# Patient Record
Sex: FEMALE | Race: OTHER | NOT HISPANIC OR LATINO
[De-identification: names, ages, dates, MRNs, and addresses within clinical notes are randomized per-mention and may not be internally consistent; named-entity substitution may affect disease eponyms.]

---

## 2017-07-11 ENCOUNTER — TRANSCRIPTION ENCOUNTER (OUTPATIENT)
Age: 42
End: 2017-07-11

## 2017-07-11 PROBLEM — Z00.00 ENCOUNTER FOR PREVENTIVE HEALTH EXAMINATION: Status: ACTIVE | Noted: 2017-07-11

## 2017-07-14 ENCOUNTER — APPOINTMENT (OUTPATIENT)
Dept: PULMONOLOGY | Facility: CLINIC | Age: 42
End: 2017-07-14

## 2017-07-26 VITALS
WEIGHT: 154.98 LBS | TEMPERATURE: 98 F | HEART RATE: 75 BPM | SYSTOLIC BLOOD PRESSURE: 135 MMHG | HEIGHT: 65 IN | DIASTOLIC BLOOD PRESSURE: 84 MMHG | OXYGEN SATURATION: 99 % | RESPIRATION RATE: 16 BRPM

## 2017-07-27 ENCOUNTER — RESULT REVIEW (OUTPATIENT)
Age: 42
End: 2017-07-27

## 2017-07-27 ENCOUNTER — INPATIENT (INPATIENT)
Facility: HOSPITAL | Age: 42
LOS: 0 days | Discharge: HOME | DRG: 745 | End: 2017-07-28
Attending: OBSTETRICS & GYNECOLOGY | Admitting: OBSTETRICS & GYNECOLOGY
Payer: COMMERCIAL

## 2017-07-27 DIAGNOSIS — Z98.890 OTHER SPECIFIED POSTPROCEDURAL STATES: Chronic | ICD-10-CM

## 2017-07-27 DIAGNOSIS — Z41.9 ENCOUNTER FOR PROCEDURE FOR PURPOSES OTHER THAN REMEDYING HEALTH STATE, UNSPECIFIED: Chronic | ICD-10-CM

## 2017-07-27 DIAGNOSIS — Z90.89 ACQUIRED ABSENCE OF OTHER ORGANS: Chronic | ICD-10-CM

## 2017-07-27 RX ORDER — OXYCODONE AND ACETAMINOPHEN 5; 325 MG/1; MG/1
2 TABLET ORAL EVERY 6 HOURS
Qty: 0 | Refills: 0 | Status: DISCONTINUED | OUTPATIENT
Start: 2017-07-27 | End: 2017-07-28

## 2017-07-27 RX ORDER — METOCLOPRAMIDE HCL 10 MG
10 TABLET ORAL EVERY 6 HOURS
Qty: 0 | Refills: 0 | Status: DISCONTINUED | OUTPATIENT
Start: 2017-07-27 | End: 2017-07-28

## 2017-07-27 RX ORDER — IBUPROFEN 200 MG
400 TABLET ORAL EVERY 6 HOURS
Qty: 0 | Refills: 0 | Status: DISCONTINUED | OUTPATIENT
Start: 2017-07-27 | End: 2017-07-28

## 2017-07-27 RX ORDER — ONDANSETRON 8 MG/1
4 TABLET, FILM COATED ORAL ONCE
Qty: 0 | Refills: 0 | Status: DISCONTINUED | OUTPATIENT
Start: 2017-07-27 | End: 2017-07-28

## 2017-07-27 RX ORDER — OXYCODONE AND ACETAMINOPHEN 5; 325 MG/1; MG/1
1 TABLET ORAL EVERY 4 HOURS
Qty: 0 | Refills: 0 | Status: DISCONTINUED | OUTPATIENT
Start: 2017-07-27 | End: 2017-07-28

## 2017-07-27 RX ORDER — DOCUSATE SODIUM 100 MG
100 CAPSULE ORAL THREE TIMES A DAY
Qty: 0 | Refills: 0 | Status: DISCONTINUED | OUTPATIENT
Start: 2017-07-27 | End: 2017-07-28

## 2017-07-27 RX ORDER — MORPHINE SULFATE 50 MG/1
4 CAPSULE, EXTENDED RELEASE ORAL
Qty: 0 | Refills: 0 | Status: DISCONTINUED | OUTPATIENT
Start: 2017-07-27 | End: 2017-07-28

## 2017-07-27 RX ORDER — KETOROLAC TROMETHAMINE 30 MG/ML
30 SYRINGE (ML) INJECTION ONCE
Qty: 0 | Refills: 0 | Status: DISCONTINUED | OUTPATIENT
Start: 2017-07-27 | End: 2017-07-27

## 2017-07-27 RX ADMIN — Medication 30 MILLIGRAM(S): at 18:02

## 2017-07-27 RX ADMIN — Medication 30 MILLIGRAM(S): at 18:17

## 2017-07-27 NOTE — H&P ADULT - HISTORY OF PRESENT ILLNESS
41 yo P0 (LMP 7/18/17) presents for endometriosis surgery for pelvic pain. Patient complains of abdominal pain, bloating, and constipation, denies dysuria. She has a history of dyspareunia although it has resolved more recently. Pain is worse on the right side, radiates to the back but not to the legs. Pain is constant but worse during menses. Menses is regular with occasional bleeding in between cycles. She was previously on OCPs which was mildly relieving but has not been on them in recent months. She takes NSAIDs prn for pain.   GynHx: hysteroscopic polypectomy  PSH: hysteroscopic polypectomy, tonsillectomy, hernia repair  Meds: NSAIDs prn  NKDA

## 2017-07-27 NOTE — BRIEF OPERATIVE NOTE - OPERATION/FINDINGS
Endometriosis of the pelvis, normal ovaries and fallopian tubes, normal appearing appendix, possible arcuate uterus w/ vertical striae

## 2017-07-27 NOTE — PROGRESS NOTE ADULT - ASSESSMENT
POD0 s/p laparoscopic endometriosis excision and appendectomy  - Pain: well controlled, OPM  - GI: regular diet  - : NATALIE petersen in AM following removal of ovarian suspensions  - DVT ppx: SCDs, ambulation  - Pulm: incentive spirometer

## 2017-07-27 NOTE — PROGRESS NOTE ADULT - SUBJECTIVE AND OBJECTIVE BOX
Pt seen at bedside in PACU for postoperative check.  She is feeling well and has no complaints at this time.  States that pain is well controlled.  Denies chest pain/shortness of breath/difficulty breathing.  No nausea/vomiting.  Tolerating clear fluids.  Not yet out of bed.  Rea in situ.      ICU Vital Signs Last 24 Hrs  T(C): 36.7 (28 Jul 2017 00:33), Max: 36.7 (27 Jul 2017 21:46)  T(F): 98 (28 Jul 2017 00:33), Max: 98.1 (27 Jul 2017 21:46)  HR: 80 (28 Jul 2017 00:33) (60 - 86)  BP: 107/69 (28 Jul 2017 00:33) (107/69 - 124/82)  BP(mean): 91 (27 Jul 2017 20:00) (87 - 97)  ABP: --  ABP(mean): --  RR: 16 (28 Jul 2017 00:33) (15 - 18)  SpO2: 97% (28 Jul 2017 00:33) (96% - 100%)    Gen: AAOx3, NAD  CV: RRR, s1s2  Pulm: CTAB  Abd: +BS, soft, mildly distended, tympanic to percussion.  No rebound or guarding.    Incision: Port sites well approximated with gauze, stained with blood tinged fluid under tegaderm; no erythema/induration.  Ovarian suspensions in place  Ext: SCDs, ext warm and well perfused    MEDICATIONS  (STANDING):  simethicone 80 milliGRAM(s) Chew daily    MEDICATIONS  (PRN):  ibuprofen  Tablet 400 milliGRAM(s) Oral every 6 hours PRN Moderate pain  oxyCODONE    5 mG/acetaminophen 325 mG 1 Tablet(s) Oral every 4 hours PRN Mild Pain (1 - 3)  oxyCODONE    5 mG/acetaminophen 325 mG 2 Tablet(s) Oral every 6 hours PRN Moderate Pain (4 - 6)  morphine  - Injectable 4 milliGRAM(s) IV Push every 15 minutes PRN Severe Pain  metoclopramide Injectable 10 milliGRAM(s) IV Push every 6 hours PRN Nausea and/or Vomiting  ondansetron Injectable 4 milliGRAM(s) IV Push once PRN Postoperative Nausea and/or Vomiting  docusate sodium 100 milliGRAM(s) Oral three times a day PRN Stool Softening

## 2017-07-28 ENCOUNTER — TRANSCRIPTION ENCOUNTER (OUTPATIENT)
Age: 42
End: 2017-07-28

## 2017-07-28 VITALS
OXYGEN SATURATION: 96 % | RESPIRATION RATE: 16 BRPM | HEART RATE: 91 BPM | SYSTOLIC BLOOD PRESSURE: 116 MMHG | DIASTOLIC BLOOD PRESSURE: 69 MMHG | TEMPERATURE: 98 F

## 2017-07-28 LAB
EXTRA GREEN TOP TUBE: SIGNIFICANT CHANGE UP
HCT VFR BLD CALC: 42.4 % — SIGNIFICANT CHANGE UP (ref 34.5–45)
HGB BLD-MCNC: 13 G/DL — SIGNIFICANT CHANGE UP (ref 11.5–15.5)
MCHC RBC-ENTMCNC: 29.1 PG — SIGNIFICANT CHANGE UP (ref 27–34)
MCHC RBC-ENTMCNC: 30.7 G/DL — LOW (ref 32–36)
MCV RBC AUTO: 94.9 FL — SIGNIFICANT CHANGE UP (ref 80–100)
PLATELET # BLD AUTO: 243 K/UL — SIGNIFICANT CHANGE UP (ref 150–400)
RBC # BLD: 4.47 M/UL — SIGNIFICANT CHANGE UP (ref 3.8–5.2)
RBC # FLD: 12.9 % — SIGNIFICANT CHANGE UP (ref 10.3–16.9)
WBC # BLD: 13.6 K/UL — HIGH (ref 3.8–10.5)
WBC # FLD AUTO: 13.6 K/UL — HIGH (ref 3.8–10.5)

## 2017-07-28 PROCEDURE — 85027 COMPLETE CBC AUTOMATED: CPT

## 2017-07-28 PROCEDURE — 36415 COLL VENOUS BLD VENIPUNCTURE: CPT

## 2017-07-28 PROCEDURE — 88302 TISSUE EXAM BY PATHOLOGIST: CPT

## 2017-07-28 PROCEDURE — 86900 BLOOD TYPING SEROLOGIC ABO: CPT

## 2017-07-28 PROCEDURE — 86901 BLOOD TYPING SEROLOGIC RH(D): CPT

## 2017-07-28 PROCEDURE — 88341 IMHCHEM/IMCYTCHM EA ADD ANTB: CPT

## 2017-07-28 PROCEDURE — 88305 TISSUE EXAM BY PATHOLOGIST: CPT

## 2017-07-28 PROCEDURE — 86850 RBC ANTIBODY SCREEN: CPT

## 2017-07-28 RX ORDER — SIMETHICONE 80 MG/1
80 TABLET, CHEWABLE ORAL DAILY
Qty: 0 | Refills: 0 | Status: DISCONTINUED | OUTPATIENT
Start: 2017-07-28 | End: 2017-07-28

## 2017-07-28 RX ADMIN — SIMETHICONE 80 MILLIGRAM(S): 80 TABLET, CHEWABLE ORAL at 09:34

## 2017-07-28 RX ADMIN — OXYCODONE AND ACETAMINOPHEN 2 TABLET(S): 5; 325 TABLET ORAL at 12:20

## 2017-07-28 RX ADMIN — Medication 400 MILLIGRAM(S): at 00:34

## 2017-07-28 RX ADMIN — OXYCODONE AND ACETAMINOPHEN 2 TABLET(S): 5; 325 TABLET ORAL at 11:54

## 2017-07-28 RX ADMIN — OXYCODONE AND ACETAMINOPHEN 2 TABLET(S): 5; 325 TABLET ORAL at 02:03

## 2017-07-28 RX ADMIN — OXYCODONE AND ACETAMINOPHEN 2 TABLET(S): 5; 325 TABLET ORAL at 01:33

## 2017-07-28 NOTE — PROGRESS NOTE ADULT - SUBJECTIVE AND OBJECTIVE BOX
GYN PROGRESS NOTE PGY3    Patient evaluated at the bedside. No acute events.  Denies CP/SOB/dizziness/nausea/vomiting/abdominal pain/calf pain.  Pain well controlled on oral pain medications. Not OOB yet due to ovarian suspensions.  Rea in place with adequate UOP. No flatus.    T(C): 36.7 (07-28-17 @ 05:00), Max: 36.7 (07-27-17 @ 21:46)  HR: 69 (07-28-17 @ 05:00) (60 - 86)  BP: 103/56 (07-28-17 @ 05:00) (103/56 - 124/82)  RR: 16 (07-28-17 @ 05:00) (15 - 18)  SpO2: 96% (07-28-17 @ 05:00) (96% - 100%)    GEN: patient appears well  LUNGS: no respiratory distress  ABD: soft, NT, ND, ovarian suspensions removed, incisions cdi other than umbilicus which had oozing therefore dressing replaced  EXT: no calf tenderness

## 2017-07-28 NOTE — DISCHARGE NOTE ADULT - CARE PROVIDER_API CALL
Roge Mann), Obstetrics and Gynecology  [address] [place] [address]  Phone: [phone]  Fax: [phone]

## 2017-07-28 NOTE — DISCHARGE NOTE ADULT - PATIENT PORTAL LINK FT
“You can access the Follow[facility] Patient Portal, offered by [facility], by registering with the following website: [email or web address]”

## 2017-07-28 NOTE — DISCHARGE NOTE ADULT - CARE PLAN
Principal Discharge DX:	Endometriosis  Goal:	healthy recovery  Instructions for follow-up, activity and diet:	walk, regular diet, nothing in the vagina 2 weeks, no baths 2 weeks, shower is ok

## 2017-07-28 NOTE — PROGRESS NOTE ADULT - ASSESSMENT
A/P: POD1 L/S excision of endometriosis  1. FEN - d/c IVH, regular diet  2. PAIN - opm  3.  - await TOV  4. RESP -  IS  5. VTE prophylaxis - scd, ambulate  6. LABS - am cbc  7. DISPO - dc home today if milestones met

## 2017-07-31 DIAGNOSIS — N80.3 ENDOMETRIOSIS OF PELVIC PERITONEUM: ICD-10-CM

## 2017-07-31 DIAGNOSIS — N94.6 DYSMENORRHEA, UNSPECIFIED: ICD-10-CM

## 2017-07-31 DIAGNOSIS — K59.00 CONSTIPATION, UNSPECIFIED: ICD-10-CM

## 2017-07-31 DIAGNOSIS — N80.8 OTHER ENDOMETRIOSIS: ICD-10-CM

## 2017-07-31 DIAGNOSIS — N92.0 EXCESSIVE AND FREQUENT MENSTRUATION WITH REGULAR CYCLE: ICD-10-CM

## 2017-07-31 DIAGNOSIS — K37 UNSPECIFIED APPENDICITIS: ICD-10-CM

## 2017-07-31 DIAGNOSIS — K58.9 IRRITABLE BOWEL SYNDROME WITHOUT DIARRHEA: ICD-10-CM

## 2017-07-31 DIAGNOSIS — N80.5 ENDOMETRIOSIS OF INTESTINE: ICD-10-CM

## 2017-07-31 DIAGNOSIS — R10.2 PELVIC AND PERINEAL PAIN: ICD-10-CM

## 2017-07-31 DIAGNOSIS — Q51.810 ARCUATE UTERUS: ICD-10-CM

## 2017-07-31 DIAGNOSIS — F98.8 OTHER SPECIFIED BEHAVIORAL AND EMOTIONAL DISORDERS WITH ONSET USUALLY OCCURRING IN CHILDHOOD AND ADOLESCENCE: ICD-10-CM

## 2017-08-08 LAB — SURGICAL PATHOLOGY STUDY: SIGNIFICANT CHANGE UP

## 2021-10-05 PROBLEM — K58.9 IRRITABLE BOWEL SYNDROME WITHOUT DIARRHEA: Chronic | Status: ACTIVE | Noted: 2017-07-27

## 2021-10-05 PROBLEM — N80.9 ENDOMETRIOSIS, UNSPECIFIED: Chronic | Status: ACTIVE | Noted: 2017-07-27

## 2021-10-05 PROBLEM — F98.8 OTHER SPECIFIED BEHAVIORAL AND EMOTIONAL DISORDERS WITH ONSET USUALLY OCCURRING IN CHILDHOOD AND ADOLESCENCE: Chronic | Status: ACTIVE | Noted: 2017-07-27

## 2021-10-23 ENCOUNTER — NON-APPOINTMENT (OUTPATIENT)
Age: 46
End: 2021-10-23

## 2021-10-26 ENCOUNTER — APPOINTMENT (OUTPATIENT)
Dept: NEUROLOGY | Facility: CLINIC | Age: 46
End: 2021-10-26
Payer: COMMERCIAL

## 2021-10-26 ENCOUNTER — FORM ENCOUNTER (OUTPATIENT)
Age: 46
End: 2021-10-26

## 2021-10-26 VITALS
HEIGHT: 66.5 IN | DIASTOLIC BLOOD PRESSURE: 90 MMHG | SYSTOLIC BLOOD PRESSURE: 147 MMHG | HEART RATE: 88 BPM | TEMPERATURE: 97.1 F | WEIGHT: 141 LBS | BODY MASS INDEX: 22.39 KG/M2 | OXYGEN SATURATION: 98 %

## 2021-10-26 PROCEDURE — 99205 OFFICE O/P NEW HI 60 MIN: CPT

## 2021-10-26 RX ORDER — DEXTROAMPHETAMINE SACCHARATE, AMPHETAMINE ASPARTATE, DEXTROAMPHETAMINE SULFATE, AND AMPHETAMINE SULFATE 5; 5; 5; 5 MG/1; MG/1; MG/1; MG/1
20 TABLET ORAL
Refills: 0 | Status: ACTIVE | COMMUNITY

## 2021-10-26 RX ORDER — AMLODIPINE BESYLATE 5 MG/1
5 TABLET ORAL DAILY
Refills: 0 | Status: ACTIVE | COMMUNITY

## 2021-10-26 RX ORDER — BUPROPION HYDROCHLORIDE 100 MG/1
100 TABLET, FILM COATED, EXTENDED RELEASE ORAL DAILY
Refills: 0 | Status: ACTIVE | COMMUNITY

## 2021-10-26 RX ORDER — PREDNISONE 5 MG/1
5 TABLET ORAL
Qty: 30 | Refills: 2 | Status: ACTIVE | COMMUNITY

## 2021-10-26 RX ORDER — HYDROCORTISONE SODIUM SUCCINATE 100 MG/2ML
100 INJECTION, POWDER, FOR SOLUTION INTRAMUSCULAR; INTRAVENOUS
Refills: 0 | Status: ACTIVE | COMMUNITY

## 2021-11-02 ENCOUNTER — APPOINTMENT (OUTPATIENT)
Dept: MRI IMAGING | Facility: HOSPITAL | Age: 46
End: 2021-11-02
Payer: COMMERCIAL

## 2021-11-02 ENCOUNTER — RESULT REVIEW (OUTPATIENT)
Age: 46
End: 2021-11-02

## 2021-11-02 ENCOUNTER — OUTPATIENT (OUTPATIENT)
Dept: OUTPATIENT SERVICES | Facility: HOSPITAL | Age: 46
LOS: 1 days | End: 2021-11-02
Payer: COMMERCIAL

## 2021-11-02 DIAGNOSIS — Z41.9 ENCOUNTER FOR PROCEDURE FOR PURPOSES OTHER THAN REMEDYING HEALTH STATE, UNSPECIFIED: Chronic | ICD-10-CM

## 2021-11-02 DIAGNOSIS — Z98.890 OTHER SPECIFIED POSTPROCEDURAL STATES: Chronic | ICD-10-CM

## 2021-11-02 DIAGNOSIS — Z90.89 ACQUIRED ABSENCE OF OTHER ORGANS: Chronic | ICD-10-CM

## 2021-11-02 PROCEDURE — 70553 MRI BRAIN STEM W/O & W/DYE: CPT

## 2021-11-02 PROCEDURE — 70553 MRI BRAIN STEM W/O & W/DYE: CPT | Mod: 26

## 2021-11-02 PROCEDURE — A9585: CPT

## 2021-11-09 ENCOUNTER — APPOINTMENT (OUTPATIENT)
Dept: MRI IMAGING | Facility: HOSPITAL | Age: 46
End: 2021-11-09

## 2021-11-10 NOTE — DISCUSSION/SUMMARY
[FreeTextEntry1] : Impression:\par 1) Cushing's disease, s/p sphenoidal resection, now requires replacement, but is having difficulty with executive dysfunctioning, mood dysregulation\par 2) attention deficit disorder?\par 3) mood and energy dysregulation\par 4) word-finding and executive dysfunction\par \par Plan:\par 1) neuropsychology testing, may need cognitive rehab\par 2) initiate venlafaxine, low dose to start, at 25mg/d, to watch the BP and early paradoxical symptoms\par 3) repeat MRI brain and pituitary

## 2021-11-10 NOTE — HISTORY OF PRESENT ILLNESS
[FreeTextEntry1] : \par Neda is a 45 yo woman, accompanied by her  Bry, today.\par \par She reports having symptoms in her mid-20's.  \par In 2013 she developed GI symptoms, bloating.\par She was diagnosed with Cushing's disease.  She had GI symptoms but also with easy bruising, hair falling out, high blood pressure and high sugar despite fasting.  She always had the round face and had cognitive dysfunction, but able to get through.  Sweating profusely at night.\par \par On her journey to this diagnosis, she had gastric studies, endometriosis surgery and was diagnosed with ADD and had allergy/immunology tests.\par \par MRI brain 05/2019 brain with contrast\par 06/2019 - found to have pituitary adenoma.\par \par Aug 2019 - transphenoidal surgery for the adenoma. \par From then only required cortisol replacement.\par \par She did not require a lot of sleep in general.\par \par She has concerns about her cognitive function.\par \par Even prior to the surgery, she [place] subway.\par Has noticed some difficulty with word-finding difficulties, which is not a 'second language issue', this is new.\par \par She continues with an endocrinologist at [facility] [place].  She finds she is very sharma through the day, which she believes is the cortisol replacement.  She also feels she is amotivational at times, then it may hit her that she has to do everything.\par Heat she finds to be draining, and it may drop her cortisol levels.\par \par Adderall taken typically at 20mg bid, occasionally will take 3 in a day when needed.\par BUproprion 100mg daily prescribed - but does not take it.  Had been prescribed it to help her quit smoking, but she did not quit and sometimes will smoke when she feels flat.\par \par Adderall helps more with energy with some help with her focus.\par She may hit some depressive states.\par Her  reported her frustrated and upset that she is goal oriented but sometimes finds she is unable to initiate and complete her tasks, and she is very goal oriented.\par \par Head pressure bifrontal.\par Will have nausea, often when cortisol is low.\par Sometimes sounds are too loud for her, and light as well.\par \par

## 2021-11-10 NOTE — PHYSICAL EXAM
[FreeTextEntry1] : General:\par Constitutional:  Sitting comfortably in NAD.\par Psychiatric: well-groomed, appropriate affect, insight/judgment intact\par Ears, Nose, Throat: no abnormalities, mucus membranes moist\par \par Neck: supple, no lymphadenopathy or nodules palpable\par Neck Circumference:\par Cardiovascular: regular rate and rhythm, normal S1/S2, no murmurs \par Chest: Clear to bases. 	Abdomen: soft, non-tender\par Extremities: no edema, clubbing or cyanosis\par Skin:  no rash or neurocutaneous signs \par \par Cognitive:\par Orientation, language, memory and knowledge screens intact.\par [place]: missed part of the trails instructions, recall -1, word generation -1, -1 repetition = 26/30\par \par Cranial Nerves:\par II: Full to confrontation; disc margins sharp. III/IV/VI: PERRL EOMF No nystagmus\par V1V2V3: Symmetric, VII: Face appears symmetric VIII: Normal to screening, IX/X: Palate Elevates Symmetrical  XI: Trapezius Symmetric  XII: Tongue midline\par Motor:\par Power: 5/5 throughout, tone: normal x 4 limbs, minimal tremor \par \par Sensation:\par Intact to light touch. \par \par Coordination/Gait:\par Finger-nose-finger intact, normal rapid-alternating movements.\par Fine motor normal with normal rapid finger taps and heel-toe tapping \par Narrow based gait\par \par Reflexes:\par DTR: 1-2+ symmetric x 4 limbs

## 2021-11-19 ENCOUNTER — RX RENEWAL (OUTPATIENT)
Age: 46
End: 2021-11-19

## 2021-12-21 ENCOUNTER — RX RENEWAL (OUTPATIENT)
Age: 46
End: 2021-12-21

## 2022-01-27 ENCOUNTER — APPOINTMENT (OUTPATIENT)
Dept: NEUROLOGY | Facility: CLINIC | Age: 47
End: 2022-01-27
Payer: COMMERCIAL

## 2022-01-27 DIAGNOSIS — D49.7 NEOPLASM OF UNSPECIFIED BEHAVIOR OF ENDOCRINE GLANDS AND OTHER PARTS OF NERVOUS SYSTEM: ICD-10-CM

## 2022-01-27 DIAGNOSIS — Z86.39 PERSONAL HISTORY OF OTHER ENDOCRINE, NUTRITIONAL AND METABOLIC DISEASE: ICD-10-CM

## 2022-01-27 DIAGNOSIS — F09 UNSPECIFIED MENTAL DISORDER DUE TO KNOWN PHYSIOLOGICAL CONDITION: ICD-10-CM

## 2022-01-27 PROCEDURE — 99214 OFFICE O/P EST MOD 30 MIN: CPT | Mod: 95

## 2022-01-27 NOTE — DISCUSSION/SUMMARY
[FreeTextEntry1] : Impression:\par 1) Cushing's disease, s/p sphenoidal resection, now requires replacement, but is having difficulty with executive dysfunctioning, mood dysregulation - hydrocortisone and adderall both 'uppers'.\par 2) attention deficit disorder?\par 3) mood and energy dysregulation\par 4) word-finding and executive dysfunction\par \par Plan:\par 1) considering buproprion, but atomoxetine as secondary.\par 2) awaiting neuropsychology testing, may need cognitive rehab\par

## 2022-01-27 NOTE — HISTORY OF PRESENT ILLNESS
[FreeTextEntry1] : \maisha Red is a 46 yo woman, seen in f/u on [facility] today, with cushing's and hypersomnolence.\par \par She tried the venlafaxine, but the 25mg made her tired and the 50mg made her even more sleepy, over 2.5 weeks.\par \par She is attempting to come down on her steroids, and is a bit more puffy and worse today.\par \par She is also interested in quitting smoking and was suggested to re-start buproprion at 100mg.\par \par Awaiting hormones and lab results.\par \par \par She reports having symptoms in her mid-20's.  \par In 2013 she developed GI symptoms, bloating.\par She was diagnosed with Cushing's disease.  She had GI symptoms but also with easy bruising, hair falling out, high blood pressure and high sugar despite fasting.  She always had the round face and had cognitive dysfunction, but able to get through.  Sweating profusely at night.\par \par On her journey to this diagnosis, she had gastric studies, endometriosis surgery and was diagnosed with ADD and had allergy/immunology tests.\par \par MRI brain 05/2019 brain with contrast\par 06/2019 - found to have pituitary adenoma.\par \par Aug 2019 - transphenoidal surgery for the adenoma. \par From then only required cortisol replacement.\par \par She did not require a lot of sleep in general.\par \par She has concerns about her cognitive function.\par \par Even prior to the surgery, she recalls becoming [place], unable to find her subway.\par Has noticed some difficulty with word-finding difficulties, which is not a 'second language issue', this is new.\par \par She continues with an endocrinologist at [facility] [place].  She finds she is very sharma through the day, which she believes is the cortisol replacement.  She also feels she is amotivational at times, then it may hit her that she has to do everything.\par Heat she finds to be draining, and it may drop her cortisol levels.\par \par Adderall taken typically at 20mg bid, occasionally will take 3 in a day when needed.\par \par Adderall helps more with energy with some help with her focus.\par She may hit some depressive states.\par Her  reported her frustrated and upset that she is goal oriented but sometimes finds she is unable to initiate and complete her tasks, and she is very goal oriented.\par \par Head pressure bifrontal.\par Will have nausea, often when cortisol is low.\par Sometimes sounds are too loud for her, and light as well.\par \par

## 2022-01-27 NOTE — REASON FOR VISIT
[Home] : at home, [unfilled] , at the time of the visit. [Medical Office: ([facility])___] : at the medical office located in  [Verbal consent obtained from patient] : the patient, [unfilled]

## 2022-02-24 PROBLEM — D49.7 PITUITARY TUMOR: Status: ACTIVE | Noted: 2021-10-26

## 2022-02-24 PROBLEM — F09 ACQUIRED COGNITIVE DYSFUNCTION: Status: ACTIVE | Noted: 2021-10-26

## 2022-02-24 PROBLEM — Z86.39 HISTORY OF CUSHING DISEASE: Status: RESOLVED | Noted: 2021-10-26 | Resolved: 2022-02-24

## 2022-03-23 ENCOUNTER — RX RENEWAL (OUTPATIENT)
Age: 47
End: 2022-03-23

## 2022-03-23 RX ORDER — VENLAFAXINE 25 MG/1
25 TABLET ORAL
Qty: 180 | Refills: 0 | Status: ACTIVE | COMMUNITY
Start: 2021-10-26

## 2022-07-21 ENCOUNTER — APPOINTMENT (OUTPATIENT)
Dept: NEUROLOGY | Facility: CLINIC | Age: 47
End: 2022-07-21